# Patient Record
(demographics unavailable — no encounter records)

---

## 2024-11-11 NOTE — HEALTH RISK ASSESSMENT
[Good] : ~his/her~  mood as  good [Never (0 pts)] : Never (0 points) [No] : In the past 12 months have you used drugs other than those required for medical reasons? No [No falls in past year] : Patient reported no falls in the past year [Little interest or pleasure doing things] : 1) Little interest or pleasure doing things [Feeling down, depressed, or hopeless] : 2) Feeling down, depressed, or hopeless [0] : 2) Feeling down, depressed, or hopeless: Not at all (0) [PHQ-2 Negative - No further assessment needed] : PHQ-2 Negative - No further assessment needed [Never] : Never [No Retinopathy] : No retinopathy [Patient reported mammogram was normal] : Patient reported mammogram was normal [Patient reported PAP Smear was normal] : Patient reported PAP Smear was normal [Patient reported bone density results were normal] : Patient reported bone density results were normal [Patient reported colonoscopy was normal] : Patient reported colonoscopy was normal [HIV test declined] : HIV test declined [Hepatitis C test declined] : Hepatitis C test declined [None] : None [With Family] : lives with family [# of Members in Household ___] :  household currently consist of [unfilled] member(s) [] :  [# Of Children ___] : has [unfilled] children [Feels Safe at Home] : Feels safe at home [Fully functional (bathing, dressing, toileting, transferring, walking, feeding)] : Fully functional (bathing, dressing, toileting, transferring, walking, feeding) [Fully functional (using the telephone, shopping, preparing meals, housekeeping, doing laundry, using] : Fully functional and needs no help or supervision to perform IADLs (using the telephone, shopping, preparing meals, housekeeping, doing laundry, using transportation, managing medications and managing finances) [Smoke Detector] : smoke detector [Carbon Monoxide Detector] : carbon monoxide detector [Seat Belt] :  uses seat belt [Reviewed no changes] : Reviewed, no changes [Aggressive treatment] : aggressive treatment [Time Spent: ___ minutes] : Time Spent: [unfilled] minutes [Fair] : ~his/her~ current health as fair  [I have developed a follow-up plan documented below in the note.] : I have developed a follow-up plan documented below in the note. [Time Spent: ___ Minutes] : I spent [unfilled] minutes performing a depression screening for this patient. [NO] : No [Retired] : retired [I will adhere to the patient's wishes.] : I will adhere to the patient's wishes. 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, Orthopedic  [de-identified] : Walking - track 2 miles each time [de-identified] : Yogurt, chicken, fruit, veggies [[id number]] : 0 [EyeExamDate] : 8/2024 [Change in mental status noted] : No change in mental status noted [Language] : denies difficulty with language [Behavior] : denies difficulty with behavior [Learning/Retaining New Information] : denies difficulty learning/retaining new information [Handling Complex Tasks] : denies difficulty handling complex tasks [Reasoning] : denies difficulty with reasoning [Spatial Ability and Orientation] : denies difficulty with spatial ability and orientation [Sexually Active] : not sexually active [High Risk Behavior] : no high risk behavior [Reports changes in hearing] : Reports no changes in hearing [Reports changes in vision] : Reports no changes in vision [Reports normal functional visual acuity (ie: able to read med bottle)] : Reports poor functional visual acuity.  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

## 2024-11-11 NOTE — PHYSICAL EXAM
[de-identified] : b/l ankle swelling minimal [TextEntry] : General: alert, awake, oriented  X 3, in no acute distress.  Eyes: PERRLA, EOM's  are intact b/l , conjunctiva clear,  Ears: Ear canal  is  clear b/l , no discharge. Tympanic membrane is   intact b/l Nose: Mucosa normal, no obstruction.  Throat: Clear, no exudates, no lesions.  Neck: Supple, no masses,  Chest: Lungs are  clear b/l , no rales, no rhonchi, no wheezes.  Heart: RR, no murmurs, no rubs, no gallops.  Abdomen: Soft, no tenderness in all quadrant , no masses, BS normal.  Extremities: FROM, no deformities,  no erythema.  Neuro: Physiological, no localizing findings.  Skin: Normal, no rashes, no lesions noted.

## 2024-11-11 NOTE — COUNSELING
[Fall prevention counseling provided] : Fall prevention counseling provided [Adequate lighting] : Adequate lighting [Use proper foot wear] : Use proper foot wear [Behavioral health counseling provided] : Behavioral health counseling provided [Engage in a relaxing activity] : Engage in a relaxing activity [Potential consequences of obesity discussed] : Potential consequences of obesity discussed [Benefits of weight loss discussed] : Benefits of weight loss discussed [Structured Weight Management Program suggested:] : Structured weight management program suggested [Weigh Self Weekly] : weigh self weekly [Decrease Portions] : decrease portions [Keep Food Diary] : keep food diary [None] : None [Good understanding] : Patient has a good understanding of disease, goals and obesity follow-up plan [FreeTextEntry4] : 5

## 2024-11-11 NOTE — REVIEW OF SYSTEMS
[TextEntry] : Head: Denies headache, dizziness Eyes: No acute  vision problem  Ears: Denies hearing loss, tinnitus, no ear pain Nose: Denies nasal obstruction or any discharges Neck: Denies stiffness or muscle tenderness Chest: Denies cough, SOB CV: Denies chest pain, palpitation Abdominal: Denies abdominal pain, change in bowel movement Neurology: Denies  changes in mental status, seizure, no neurological deficit, frequent migraines - worse with allergy season, endorses insomnia extremities: ankle swelling less than usual feet: WNL no lesions noted

## 2024-11-11 NOTE — HISTORY OF PRESENT ILLNESS
[FreeTextEntry1] : Annual wellness visit [de-identified] : 63y/f with PMH of HTN, HLD, OA of right knee, Asthma, Migraine, Psx of hysterectomy, pelvic reconstruction for uterus prolapse, herniated disc resolved, presented for annual wellness visit. C/o lower back pain, follows up with ortho doctor. No weakness or numbness of extremities.- resolved C/o sleep problems, worse with menopause- still in menopause with hot flashes. takes unisom OTC. wants an alternative as she read unisom can cause alzeimers.  flu shot given today  currently on: Albuterol Sulfate  (90 Base) MCG/ACT Inhalation Aerosol Solution; INHALE 1 TO 2 PUFFS BY MOUTH EVERY 4 TO 6 HOURS AS NEEDED Amoxicillin-Pot Clavulanate 875-125 MG Oral Tablet; TAKE 1 TABLET TWICE DAILY AFTER MEALS Butalbital-APAP-Caffeine -40 MG Oral Capsule; TAKE 1 CAPSULE 3 times daily MDD:3 tab dilTIAZem HCl  MG Oral Capsule Extended Release 24 Hour; TAKE 1 CAPSULE Daily Fenofibrate 160 MG Oral Tablet; TAKE 1 TABLET DAILY Flonase Allergy Relief 50 MCG/ACT Nasal Suspension; USE 2 SPRAYS IN EACH NOSTRIL TWICE DAILY Losartan Potassium-HCTZ 100-25 MG Oral Tablet; TAKE 1 TABLET BY MOUTH EVERY DAY Montelukast Sodium 10 MG Oral Tablet; TAKE 1 TABLET AT BEDTIME

## 2024-11-11 NOTE — ASSESSMENT
[FreeTextEntry1] : Patient has been loosening weight , on intermittent fasting Feels better, recommend to continue it as directed

## 2024-12-20 NOTE — DISCUSSION/SUMMARY
[FreeTextEntry1] : 2 episodes of coronary vasospasm over last 5 years Doing very well on cardizem Cath normal in past   HTN- BP decent today on increased dose of Losartan/HCTZ. Continue  Cont Tricor for hyperTG, blood work with PCP- last , . Cont low carb diet   Rare palps, none for the last few months. Likely benign atrial or ventricular ectopy. Patient educated on nature and etiology of palpitations. Advised to limit caffeine intake and increase hydration  PVC on ECG today, nonspecific T wave changes. Repeat TTE  RV 6M  [EKG obtained to assist in diagnosis and management of assessed problem(s)] : EKG obtained to assist in diagnosis and management of assessed problem(s)

## 2024-12-20 NOTE — PHYSICAL EXAM

## 2024-12-20 NOTE — HISTORY OF PRESENT ILLNESS
[FreeTextEntry1] : 63F HTN, HLD, coronary vasospasm  Pt has lost 20 lbs since the summer- retiring Feeling well  BP much better controlled No further episodes of coronary vasospasm, also had palpitations which have now improved Rare palpitations, usually at night- has not happened in awhile   HISTORY:  In 2016, was vacationing in [place], woke up from sleep with chest heaviness, called EMS, relieved with NTG, went to hospital, +troponin, s/p clean cardiac cath, was told of possible coronary vasospasm.  In July of 2021, had a more severe episode, with chest pain, radiating down L arm, R sided jaw pain. Went to [facility], noted with + troponin, s/p cardiac cath with clean coronaries. Was put on Cardizem for suspected vasospasm, but was told by primary cardiologist that she no longer needed to take it.  Amlodipine switched to cardizem  Never smoker. Father had a CVA possibly from Afib. Brother had an MI with multiple stents, first in mid 50s. 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

## 2025-05-15 NOTE — PHYSICAL EXAM

## 2025-05-15 NOTE — DISCUSSION/SUMMARY
[EKG obtained to assist in diagnosis and management of assessed problem(s)] : EKG obtained to assist in diagnosis and management of assessed problem(s) [FreeTextEntry1] : 2 episodes of coronary vasospasm over last 5 years Doing very well on Cardizem without recurrence Cath normal in past   HTN- BP great today. Continue Losartan/HCTZ. K 4.0, Crt 0.94  Cont Tricor for hyperTG, blood work with PCP- last , . Cont low carb diet. Has lost 29 lbs. Pending follow up blood work  Palpitations much improved. Likely benign atrial or ventricular ectopy. Patient educated on nature and etiology of palpitations. Advised to limit caffeine intake and increase hydration  PVC on prior ECG, none today.  No active symptoms, TTE unremarkable. Continue to monitor.   This patient is able to perform >4 METS of activity without limitation. No evidence of ongoing ischemia, arrhythmia, valvular heart disease or decompensated heart failure.  This patient is optimized from a cardiac standpoint for this low risk surgery.  No further cardiac testing is necessary prior to surgery.  RV 6M

## 2025-05-15 NOTE — HISTORY OF PRESENT ILLNESS
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 No further episodes of coronary vasospasm, palpitations much improved on cardizem 29 lb weight loss, intermittent fasting, exercising more    HISTORY:  In 2016, was vacationing in [place], woke up from sleep with chest heaviness, called EMS, relieved with NTG, went to hospital, +troponin, s/p clean cardiac cath, was told of possible coronary vasospasm.  In July of 2021, had a more severe episode, with chest pain, radiating down L arm, R sided jaw pain. Went to [facility], noted with + troponin, s/p cardiac cath with clean coronaries. Was put on Cardizem for suspected vasospasm, but was told by primary cardiologist that she no longer needed to take it.  Amlodipine switched to cardizem  Never smoker. Father had a CVA possibly from Afib. Brother had an MI with multiple stents, first in mid 50s. 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 Left ventricular cavity is normal in size. Left ventricular wall thickness is normal. Left ventricular systolic function is normal with an ejection fraction of 66 % by Killian's method of disks. There are no regional wall motion abnormalities seen. 2. Normal left ventricular diastolic function, with normal left ventricular filling pressure. 3. Normal right ventricular cavity size, with normal wall thickness, and normal right ventricular systolic function. Tricuspid annular plane systolic excursion (TAPSE) is 2.4 cm (normal >=1.7 cm). 4. Left atrium is normal in size. 5. No significant valvular disease.  Tricor 160mg  Cardizem 240mg losartan 100mg-HCTZ 25mg